# Patient Record
Sex: MALE | Employment: OTHER | URBAN - METROPOLITAN AREA
[De-identification: names, ages, dates, MRNs, and addresses within clinical notes are randomized per-mention and may not be internally consistent; named-entity substitution may affect disease eponyms.]

---

## 2018-05-29 ENCOUNTER — HOSPITAL ENCOUNTER (INPATIENT)
Age: 73
LOS: 1 days | Discharge: LEFT BEFORE DISCHARGE | DRG: 948 | End: 2018-05-30
Attending: EMERGENCY MEDICINE | Admitting: FAMILY MEDICINE
Payer: MEDICARE

## 2018-05-29 ENCOUNTER — APPOINTMENT (OUTPATIENT)
Dept: GENERAL RADIOLOGY | Age: 73
DRG: 948 | End: 2018-05-29
Attending: STUDENT IN AN ORGANIZED HEALTH CARE EDUCATION/TRAINING PROGRAM
Payer: MEDICARE

## 2018-05-29 ENCOUNTER — APPOINTMENT (OUTPATIENT)
Dept: CT IMAGING | Age: 73
DRG: 948 | End: 2018-05-29
Attending: STUDENT IN AN ORGANIZED HEALTH CARE EDUCATION/TRAINING PROGRAM
Payer: MEDICARE

## 2018-05-29 ENCOUNTER — APPOINTMENT (OUTPATIENT)
Dept: CT IMAGING | Age: 73
DRG: 948 | End: 2018-05-29
Attending: FAMILY MEDICINE
Payer: MEDICARE

## 2018-05-29 DIAGNOSIS — G93.40 ACUTE ENCEPHALOPATHY: Primary | ICD-10-CM

## 2018-05-29 DIAGNOSIS — R41.82 ALTERED MENTAL STATUS, UNSPECIFIED ALTERED MENTAL STATUS TYPE: ICD-10-CM

## 2018-05-29 LAB
ABO + RH BLD: NORMAL
ALBUMIN SERPL-MCNC: 3.8 G/DL (ref 3.5–5)
ALBUMIN/GLOB SERPL: 0.9 {RATIO} (ref 1.1–2.2)
ALP SERPL-CCNC: 125 U/L (ref 45–117)
ALT SERPL-CCNC: 30 U/L (ref 12–78)
AMPHET UR QL SCN: NEGATIVE
ANION GAP SERPL CALC-SCNC: 8 MMOL/L (ref 5–15)
APAP SERPL-MCNC: <2 UG/ML (ref 10–30)
APPEARANCE UR: CLEAR
APTT PPP: 25.4 SEC (ref 22.1–32)
ARTERIAL PATENCY WRIST A: YES
AST SERPL-CCNC: 31 U/L (ref 15–37)
ATRIAL RATE: 73 BPM
BACTERIA URNS QL MICRO: NEGATIVE /HPF
BARBITURATES UR QL SCN: POSITIVE
BASE DEFICIT BLD-SCNC: 1 MMOL/L
BASOPHILS # BLD: 0 K/UL (ref 0–0.1)
BASOPHILS NFR BLD: 0 % (ref 0–1)
BDY SITE: ABNORMAL
BENZODIAZ UR QL: NEGATIVE
BILIRUB SERPL-MCNC: 0.4 MG/DL (ref 0.2–1)
BILIRUB UR QL: NEGATIVE
BLOOD GROUP ANTIBODIES SERPL: NORMAL
BLOOD GROUP ANTIBODIES SERPL: NORMAL
BUN SERPL-MCNC: 34 MG/DL (ref 6–20)
BUN/CREAT SERPL: 14 (ref 12–20)
CALCIUM SERPL-MCNC: 9.7 MG/DL (ref 8.5–10.1)
CALCULATED P AXIS, ECG09: 67 DEGREES
CALCULATED R AXIS, ECG10: 16 DEGREES
CALCULATED T AXIS, ECG11: 64 DEGREES
CANNABINOIDS UR QL SCN: NEGATIVE
CHLORIDE SERPL-SCNC: 106 MMOL/L (ref 97–108)
CHOLEST SERPL-MCNC: 142 MG/DL
CK SERPL-CCNC: 184 U/L (ref 39–308)
CO2 SERPL-SCNC: 26 MMOL/L (ref 21–32)
COCAINE UR QL SCN: NEGATIVE
COLOR UR: ABNORMAL
CREAT SERPL-MCNC: 2.39 MG/DL (ref 0.7–1.3)
D DIMER PPP FEU-MCNC: 0.69 MG/L FEU (ref 0–0.65)
DIAGNOSIS, 93000: NORMAL
DIFFERENTIAL METHOD BLD: NORMAL
DRUG SCRN COMMENT,DRGCM: ABNORMAL
EOSINOPHIL # BLD: 0.2 K/UL (ref 0–0.4)
EOSINOPHIL NFR BLD: 3 % (ref 0–7)
EPITH CASTS URNS QL MICRO: ABNORMAL /LPF
ERYTHROCYTE [DISTWIDTH] IN BLOOD BY AUTOMATED COUNT: 13.5 % (ref 11.5–14.5)
EST. AVERAGE GLUCOSE BLD GHB EST-MCNC: 203 MG/DL
ETHANOL SERPL-MCNC: <10 MG/DL
FOLATE SERPL-MCNC: 42.8 NG/ML (ref 5–21)
GAS FLOW.O2 O2 DELIVERY SYS: ABNORMAL L/MIN
GLOBULIN SER CALC-MCNC: 4.1 G/DL (ref 2–4)
GLUCOSE BLD STRIP.AUTO-MCNC: 175 MG/DL (ref 65–100)
GLUCOSE BLD STRIP.AUTO-MCNC: 182 MG/DL (ref 65–100)
GLUCOSE SERPL-MCNC: 197 MG/DL (ref 65–100)
GLUCOSE UR STRIP.AUTO-MCNC: 100 MG/DL
HBA1C MFR BLD: 8.7 % (ref 4.2–6.3)
HCO3 BLD-SCNC: 24.1 MMOL/L (ref 22–26)
HCT VFR BLD AUTO: 40.3 % (ref 36.6–50.3)
HDLC SERPL-MCNC: 34 MG/DL
HDLC SERPL: 4.2 {RATIO} (ref 0–5)
HGB BLD-MCNC: 12.8 G/DL (ref 12.1–17)
HGB UR QL STRIP: ABNORMAL
HYALINE CASTS URNS QL MICRO: ABNORMAL /LPF (ref 0–5)
IMM GRANULOCYTES # BLD: 0 K/UL (ref 0–0.04)
IMM GRANULOCYTES NFR BLD AUTO: 0 % (ref 0–0.5)
INR PPP: 1 (ref 0.9–1.1)
KETONES UR QL STRIP.AUTO: NEGATIVE MG/DL
LACTATE SERPL-SCNC: 0.8 MMOL/L (ref 0.4–2)
LDLC SERPL CALC-MCNC: 60.4 MG/DL (ref 0–100)
LEUKOCYTE ESTERASE UR QL STRIP.AUTO: NEGATIVE
LIPID PROFILE,FLP: ABNORMAL
LYMPHOCYTES # BLD: 1.2 K/UL (ref 0.8–3.5)
LYMPHOCYTES NFR BLD: 16 % (ref 12–49)
MAGNESIUM SERPL-MCNC: 1.7 MG/DL (ref 1.6–2.4)
MAGNESIUM SERPL-MCNC: 1.8 MG/DL (ref 1.6–2.4)
MCH RBC QN AUTO: 30 PG (ref 26–34)
MCHC RBC AUTO-ENTMCNC: 31.8 G/DL (ref 30–36.5)
MCV RBC AUTO: 94.6 FL (ref 80–99)
METHADONE UR QL: NEGATIVE
MONOCYTES # BLD: 0.4 K/UL (ref 0–1)
MONOCYTES NFR BLD: 6 % (ref 5–13)
NEUTS SEG # BLD: 5.4 K/UL (ref 1.8–8)
NEUTS SEG NFR BLD: 74 % (ref 32–75)
NITRITE UR QL STRIP.AUTO: NEGATIVE
NRBC # BLD: 0 K/UL (ref 0–0.01)
NRBC BLD-RTO: 0 PER 100 WBC
O2/TOTAL GAS SETTING VFR VENT: 0.21 %
OPIATES UR QL: NEGATIVE
P-R INTERVAL, ECG05: 184 MS
PCO2 BLD: 38.6 MMHG (ref 35–45)
PCP UR QL: NEGATIVE
PH BLD: 7.4 [PH] (ref 7.35–7.45)
PH UR STRIP: 7.5 [PH] (ref 5–8)
PHOSPHATE SERPL-MCNC: 2.5 MG/DL (ref 2.6–4.7)
PLATELET # BLD AUTO: 154 K/UL (ref 150–400)
PMV BLD AUTO: 9.8 FL (ref 8.9–12.9)
PO2 BLD: 67 MMHG (ref 80–100)
POTASSIUM SERPL-SCNC: 4.9 MMOL/L (ref 3.5–5.1)
PROT SERPL-MCNC: 7.9 G/DL (ref 6.4–8.2)
PROT UR STRIP-MCNC: 300 MG/DL
PROTHROMBIN TIME: 10.7 SEC (ref 9–11.1)
Q-T INTERVAL, ECG07: 414 MS
QRS DURATION, ECG06: 92 MS
QTC CALCULATION (BEZET), ECG08: 456 MS
RBC # BLD AUTO: 4.26 M/UL (ref 4.1–5.7)
RBC #/AREA URNS HPF: ABNORMAL /HPF (ref 0–5)
SALICYLATES SERPL-MCNC: <1.7 MG/DL (ref 2.8–20)
SAO2 % BLD: 93 % (ref 92–97)
SERVICE CMNT-IMP: ABNORMAL
SERVICE CMNT-IMP: ABNORMAL
SODIUM SERPL-SCNC: 140 MMOL/L (ref 136–145)
SP GR UR REFRACTOMETRY: 1.01 (ref 1–1.03)
SPECIMEN EXP DATE BLD: NORMAL
SPECIMEN TYPE: ABNORMAL
THERAPEUTIC RANGE,PTTT: NORMAL SECS (ref 58–77)
TOTAL RESP. RATE, ITRR: 20
TRIGL SERPL-MCNC: 238 MG/DL (ref ?–150)
TROPONIN I SERPL-MCNC: <0.04 NG/ML
TROPONIN I SERPL-MCNC: <0.04 NG/ML
UR CULT HOLD, URHOLD: NORMAL
UROBILINOGEN UR QL STRIP.AUTO: 0.2 EU/DL (ref 0.2–1)
VENTRICULAR RATE, ECG03: 73 BPM
VLDLC SERPL CALC-MCNC: 47.6 MG/DL
WBC # BLD AUTO: 7.3 K/UL (ref 4.1–11.1)
WBC URNS QL MICRO: ABNORMAL /HPF (ref 0–4)

## 2018-05-29 PROCEDURE — 51798 US URINE CAPACITY MEASURE: CPT

## 2018-05-29 PROCEDURE — 80307 DRUG TEST PRSMV CHEM ANLYZR: CPT | Performed by: STUDENT IN AN ORGANIZED HEALTH CARE EDUCATION/TRAINING PROGRAM

## 2018-05-29 PROCEDURE — 93306 TTE W/DOPPLER COMPLETE: CPT | Performed by: FAMILY MEDICINE

## 2018-05-29 PROCEDURE — 65660000000 HC RM CCU STEPDOWN

## 2018-05-29 PROCEDURE — 83036 HEMOGLOBIN GLYCOSYLATED A1C: CPT | Performed by: FAMILY MEDICINE

## 2018-05-29 PROCEDURE — 80053 COMPREHEN METABOLIC PANEL: CPT | Performed by: STUDENT IN AN ORGANIZED HEALTH CARE EDUCATION/TRAINING PROGRAM

## 2018-05-29 PROCEDURE — 74011000250 HC RX REV CODE- 250: Performed by: FAMILY MEDICINE

## 2018-05-29 PROCEDURE — 93880 EXTRACRANIAL BILAT STUDY: CPT

## 2018-05-29 PROCEDURE — 74011250637 HC RX REV CODE- 250/637: Performed by: HOSPITALIST

## 2018-05-29 PROCEDURE — 36415 COLL VENOUS BLD VENIPUNCTURE: CPT | Performed by: STUDENT IN AN ORGANIZED HEALTH CARE EDUCATION/TRAINING PROGRAM

## 2018-05-29 PROCEDURE — 99285 EMERGENCY DEPT VISIT HI MDM: CPT

## 2018-05-29 PROCEDURE — 95816 EEG AWAKE AND DROWSY: CPT | Performed by: FAMILY MEDICINE

## 2018-05-29 PROCEDURE — 83605 ASSAY OF LACTIC ACID: CPT | Performed by: STUDENT IN AN ORGANIZED HEALTH CARE EDUCATION/TRAINING PROGRAM

## 2018-05-29 PROCEDURE — 94640 AIRWAY INHALATION TREATMENT: CPT

## 2018-05-29 PROCEDURE — 80061 LIPID PANEL: CPT | Performed by: FAMILY MEDICINE

## 2018-05-29 PROCEDURE — 81001 URINALYSIS AUTO W/SCOPE: CPT | Performed by: STUDENT IN AN ORGANIZED HEALTH CARE EDUCATION/TRAINING PROGRAM

## 2018-05-29 PROCEDURE — 77030034849

## 2018-05-29 PROCEDURE — 82550 ASSAY OF CK (CPK): CPT | Performed by: FAMILY MEDICINE

## 2018-05-29 PROCEDURE — 82746 ASSAY OF FOLIC ACID SERUM: CPT | Performed by: FAMILY MEDICINE

## 2018-05-29 PROCEDURE — 82962 GLUCOSE BLOOD TEST: CPT

## 2018-05-29 PROCEDURE — 85610 PROTHROMBIN TIME: CPT | Performed by: STUDENT IN AN ORGANIZED HEALTH CARE EDUCATION/TRAINING PROGRAM

## 2018-05-29 PROCEDURE — 86850 RBC ANTIBODY SCREEN: CPT | Performed by: STUDENT IN AN ORGANIZED HEALTH CARE EDUCATION/TRAINING PROGRAM

## 2018-05-29 PROCEDURE — 83735 ASSAY OF MAGNESIUM: CPT | Performed by: STUDENT IN AN ORGANIZED HEALTH CARE EDUCATION/TRAINING PROGRAM

## 2018-05-29 PROCEDURE — 82803 BLOOD GASES ANY COMBINATION: CPT

## 2018-05-29 PROCEDURE — 86870 RBC ANTIBODY IDENTIFICATION: CPT | Performed by: STUDENT IN AN ORGANIZED HEALTH CARE EDUCATION/TRAINING PROGRAM

## 2018-05-29 PROCEDURE — 36600 WITHDRAWAL OF ARTERIAL BLOOD: CPT

## 2018-05-29 PROCEDURE — 74011250636 HC RX REV CODE- 250/636: Performed by: FAMILY MEDICINE

## 2018-05-29 PROCEDURE — 77030005518 HC CATH URETH FOL 2W BARD -B

## 2018-05-29 PROCEDURE — 74011250636 HC RX REV CODE- 250/636

## 2018-05-29 PROCEDURE — 87040 BLOOD CULTURE FOR BACTERIA: CPT | Performed by: STUDENT IN AN ORGANIZED HEALTH CARE EDUCATION/TRAINING PROGRAM

## 2018-05-29 PROCEDURE — 85730 THROMBOPLASTIN TIME PARTIAL: CPT | Performed by: STUDENT IN AN ORGANIZED HEALTH CARE EDUCATION/TRAINING PROGRAM

## 2018-05-29 PROCEDURE — 74176 CT ABD & PELVIS W/O CONTRAST: CPT

## 2018-05-29 PROCEDURE — 71045 X-RAY EXAM CHEST 1 VIEW: CPT

## 2018-05-29 PROCEDURE — 84100 ASSAY OF PHOSPHORUS: CPT | Performed by: FAMILY MEDICINE

## 2018-05-29 PROCEDURE — 84484 ASSAY OF TROPONIN QUANT: CPT | Performed by: STUDENT IN AN ORGANIZED HEALTH CARE EDUCATION/TRAINING PROGRAM

## 2018-05-29 PROCEDURE — 93005 ELECTROCARDIOGRAM TRACING: CPT

## 2018-05-29 PROCEDURE — 74011250637 HC RX REV CODE- 250/637

## 2018-05-29 PROCEDURE — 85379 FIBRIN DEGRADATION QUANT: CPT | Performed by: FAMILY MEDICINE

## 2018-05-29 PROCEDURE — 85025 COMPLETE CBC W/AUTO DIFF WBC: CPT | Performed by: STUDENT IN AN ORGANIZED HEALTH CARE EDUCATION/TRAINING PROGRAM

## 2018-05-29 PROCEDURE — 83735 ASSAY OF MAGNESIUM: CPT | Performed by: FAMILY MEDICINE

## 2018-05-29 PROCEDURE — 74011250636 HC RX REV CODE- 250/636: Performed by: STUDENT IN AN ORGANIZED HEALTH CARE EDUCATION/TRAINING PROGRAM

## 2018-05-29 PROCEDURE — 74011250637 HC RX REV CODE- 250/637: Performed by: FAMILY MEDICINE

## 2018-05-29 PROCEDURE — 70450 CT HEAD/BRAIN W/O DYE: CPT

## 2018-05-29 RX ORDER — AMLODIPINE BESYLATE 10 MG/1
5 TABLET ORAL DAILY
COMMUNITY

## 2018-05-29 RX ORDER — TRAMADOL HYDROCHLORIDE 50 MG/1
50 TABLET ORAL
COMMUNITY

## 2018-05-29 RX ORDER — BUMETANIDE 1 MG/1
1 TABLET ORAL 2 TIMES DAILY
COMMUNITY

## 2018-05-29 RX ORDER — ACETAMINOPHEN 650 MG/1
650 SUPPOSITORY RECTAL
Status: DISCONTINUED | OUTPATIENT
Start: 2018-05-29 | End: 2018-05-30 | Stop reason: HOSPADM

## 2018-05-29 RX ORDER — PRIMIDONE 50 MG/1
50 TABLET ORAL 2 TIMES DAILY
COMMUNITY

## 2018-05-29 RX ORDER — SERTRALINE HYDROCHLORIDE 100 MG/1
100 TABLET, FILM COATED ORAL DAILY
COMMUNITY

## 2018-05-29 RX ORDER — MAGNESIUM SULFATE 100 %
4 CRYSTALS MISCELLANEOUS AS NEEDED
Status: DISCONTINUED | OUTPATIENT
Start: 2018-05-29 | End: 2018-05-30 | Stop reason: HOSPADM

## 2018-05-29 RX ORDER — DOXEPIN HYDROCHLORIDE 10 MG/1
50 CAPSULE ORAL
COMMUNITY

## 2018-05-29 RX ORDER — METOPROLOL SUCCINATE 100 MG/1
25 TABLET, EXTENDED RELEASE ORAL DAILY
COMMUNITY

## 2018-05-29 RX ORDER — ONDANSETRON 2 MG/ML
INJECTION INTRAMUSCULAR; INTRAVENOUS
Status: COMPLETED
Start: 2018-05-29 | End: 2018-05-29

## 2018-05-29 RX ORDER — PANTOPRAZOLE SODIUM 40 MG/1
40 TABLET, DELAYED RELEASE ORAL
Status: DISCONTINUED | OUTPATIENT
Start: 2018-05-30 | End: 2018-05-30 | Stop reason: HOSPADM

## 2018-05-29 RX ORDER — BUDESONIDE AND FORMOTEROL FUMARATE DIHYDRATE 160; 4.5 UG/1; UG/1
2 AEROSOL RESPIRATORY (INHALATION) 2 TIMES DAILY
COMMUNITY

## 2018-05-29 RX ORDER — AMLODIPINE BESYLATE 5 MG/1
5 TABLET ORAL DAILY
Status: DISCONTINUED | OUTPATIENT
Start: 2018-05-30 | End: 2018-05-30 | Stop reason: HOSPADM

## 2018-05-29 RX ORDER — ALLOPURINOL 100 MG/1
100 TABLET ORAL DAILY
COMMUNITY

## 2018-05-29 RX ORDER — DEXTROSE 50 % IN WATER (D50W) INTRAVENOUS SYRINGE
12.5-25 AS NEEDED
Status: DISCONTINUED | OUTPATIENT
Start: 2018-05-29 | End: 2018-05-30 | Stop reason: HOSPADM

## 2018-05-29 RX ORDER — ONDANSETRON 2 MG/ML
4 INJECTION INTRAMUSCULAR; INTRAVENOUS
Status: DISCONTINUED | OUTPATIENT
Start: 2018-05-29 | End: 2018-05-30 | Stop reason: HOSPADM

## 2018-05-29 RX ORDER — INSULIN LISPRO 100 [IU]/ML
INJECTION, SOLUTION INTRAVENOUS; SUBCUTANEOUS EVERY 6 HOURS
Status: DISCONTINUED | OUTPATIENT
Start: 2018-05-29 | End: 2018-05-30 | Stop reason: HOSPADM

## 2018-05-29 RX ORDER — ATORVASTATIN CALCIUM 20 MG/1
80 TABLET, FILM COATED ORAL DAILY
Status: DISCONTINUED | OUTPATIENT
Start: 2018-05-30 | End: 2018-05-30 | Stop reason: HOSPADM

## 2018-05-29 RX ORDER — LIDOCAINE HYDROCHLORIDE 20 MG/ML
JELLY TOPICAL ONCE
Status: DISPENSED | OUTPATIENT
Start: 2018-05-29 | End: 2018-05-29

## 2018-05-29 RX ORDER — SODIUM CHLORIDE 0.9 % (FLUSH) 0.9 %
5-10 SYRINGE (ML) INJECTION AS NEEDED
Status: DISCONTINUED | OUTPATIENT
Start: 2018-05-29 | End: 2018-05-30 | Stop reason: HOSPADM

## 2018-05-29 RX ORDER — TRAMADOL HYDROCHLORIDE 50 MG/1
50 TABLET ORAL
Status: DISCONTINUED | OUTPATIENT
Start: 2018-05-29 | End: 2018-05-30 | Stop reason: HOSPADM

## 2018-05-29 RX ORDER — METOPROLOL SUCCINATE 50 MG/1
25 TABLET, EXTENDED RELEASE ORAL DAILY
Status: DISCONTINUED | OUTPATIENT
Start: 2018-05-30 | End: 2018-05-30 | Stop reason: HOSPADM

## 2018-05-29 RX ORDER — OMEPRAZOLE 20 MG/1
20 CAPSULE, DELAYED RELEASE ORAL DAILY
COMMUNITY

## 2018-05-29 RX ORDER — CALCITRIOL 0.25 UG/1
0.25 CAPSULE ORAL
COMMUNITY

## 2018-05-29 RX ORDER — GUAIFENESIN 100 MG/5ML
81 LIQUID (ML) ORAL
Status: ACTIVE | OUTPATIENT
Start: 2018-05-29 | End: 2018-05-30

## 2018-05-29 RX ORDER — GABAPENTIN 100 MG/1
400 CAPSULE ORAL 3 TIMES DAILY
COMMUNITY

## 2018-05-29 RX ORDER — SODIUM CHLORIDE 0.9 % (FLUSH) 0.9 %
5-10 SYRINGE (ML) INJECTION EVERY 8 HOURS
Status: DISCONTINUED | OUTPATIENT
Start: 2018-05-29 | End: 2018-05-30 | Stop reason: HOSPADM

## 2018-05-29 RX ORDER — SODIUM CHLORIDE 9 MG/ML
75 INJECTION, SOLUTION INTRAVENOUS CONTINUOUS
Status: DISPENSED | OUTPATIENT
Start: 2018-05-29 | End: 2018-05-30

## 2018-05-29 RX ORDER — OMEPRAZOLE 20 MG/1
20 CAPSULE, DELAYED RELEASE ORAL DAILY
Status: DISCONTINUED | OUTPATIENT
Start: 2018-05-30 | End: 2018-05-29 | Stop reason: CLARIF

## 2018-05-29 RX ORDER — ARFORMOTEROL TARTRATE 15 UG/2ML
15 SOLUTION RESPIRATORY (INHALATION)
Status: DISCONTINUED | OUTPATIENT
Start: 2018-05-29 | End: 2018-05-30 | Stop reason: HOSPADM

## 2018-05-29 RX ORDER — BUDESONIDE 0.5 MG/2ML
500 INHALANT ORAL
Status: DISCONTINUED | OUTPATIENT
Start: 2018-05-29 | End: 2018-05-30 | Stop reason: HOSPADM

## 2018-05-29 RX ORDER — HYDRALAZINE HYDROCHLORIDE 20 MG/ML
20 INJECTION INTRAMUSCULAR; INTRAVENOUS
Status: DISCONTINUED | OUTPATIENT
Start: 2018-05-29 | End: 2018-05-30 | Stop reason: HOSPADM

## 2018-05-29 RX ORDER — LORAZEPAM 2 MG/ML
2 INJECTION INTRAMUSCULAR
Status: DISCONTINUED | OUTPATIENT
Start: 2018-05-29 | End: 2018-05-30 | Stop reason: HOSPADM

## 2018-05-29 RX ORDER — CALCITRIOL 0.25 UG/1
0.25 CAPSULE ORAL
Status: DISCONTINUED | OUTPATIENT
Start: 2018-05-30 | End: 2018-05-30 | Stop reason: HOSPADM

## 2018-05-29 RX ORDER — SODIUM CHLORIDE 0.9 % (FLUSH) 0.9 %
SYRINGE (ML) INJECTION
Status: DISPENSED
Start: 2018-05-29 | End: 2018-05-30

## 2018-05-29 RX ORDER — ACETAMINOPHEN 650 MG/1
SUPPOSITORY RECTAL
Status: COMPLETED
Start: 2018-05-29 | End: 2018-05-29

## 2018-05-29 RX ORDER — ATORVASTATIN CALCIUM 10 MG/1
80 TABLET, FILM COATED ORAL DAILY
COMMUNITY

## 2018-05-29 RX ORDER — SERTRALINE HYDROCHLORIDE 50 MG/1
100 TABLET, FILM COATED ORAL DAILY
Status: DISCONTINUED | OUTPATIENT
Start: 2018-05-30 | End: 2018-05-30 | Stop reason: HOSPADM

## 2018-05-29 RX ADMIN — TRAMADOL HYDROCHLORIDE 50 MG: 50 TABLET, FILM COATED ORAL at 23:58

## 2018-05-29 RX ADMIN — SODIUM CHLORIDE 1000 ML: 900 INJECTION, SOLUTION INTRAVENOUS at 13:36

## 2018-05-29 RX ADMIN — BUDESONIDE 500 MCG: 0.5 INHALANT RESPIRATORY (INHALATION) at 22:04

## 2018-05-29 RX ADMIN — ACETAMINOPHEN 650 MG: 650 SUPPOSITORY RECTAL at 18:13

## 2018-05-29 RX ADMIN — ARFORMOTEROL TARTRATE 15 MCG: 15 SOLUTION RESPIRATORY (INHALATION) at 22:04

## 2018-05-29 RX ADMIN — Medication 10 ML: at 18:10

## 2018-05-29 RX ADMIN — SODIUM CHLORIDE 75 ML/HR: 900 INJECTION, SOLUTION INTRAVENOUS at 18:07

## 2018-05-29 RX ADMIN — ACETAMINOPHEN 650 MG: 650 SUPPOSITORY RECTAL at 22:45

## 2018-05-29 RX ADMIN — ONDANSETRON 4 MG: 2 INJECTION INTRAMUSCULAR; INTRAVENOUS at 18:07

## 2018-05-29 RX ADMIN — HYDRALAZINE HYDROCHLORIDE 20 MG: 20 INJECTION INTRAMUSCULAR; INTRAVENOUS at 15:15

## 2018-05-29 NOTE — ED NOTES
Unable to straight cath x 3 unable to advance past prostate. MD notified. Bladder scan showed 228ml.

## 2018-05-29 NOTE — PROCEDURES
[facility]  *** FINAL REPORT ***    Name: Say Velarde  MRN: [id number]    Inpatient  : 1945  HIS Order #: [id number]  [address] [facility] Visit #: [id number]  Date: 29 May 2018    TYPE OF TEST: Cerebrovascular Duplex    REASON FOR TEST  AMS    Right Carotid:-             Proximal               Mid                 Distal  cm/s  Systolic  Diastolic  Systolic  Diastolic  Systolic  Diastolic  CCA:     36.2      13.0                            57.0      14.0  Bulb:  ECA:    123.0      13.0  ICA:     61.0      11.0       97.0      22.0       10.1      24.0  ICA/CCA:  1.1       0.8    ICA Stenosis:    Right Vertebral:-  Finding:  Sys:  Denisse:    Right Subclavian:    Left Carotid:-            Proximal                Mid                 Distal  cm/s  Systolic  Diastolic  Systolic  Diastolic  Systolic  Diastolic  CCA:     85.2      14.0                            71.0      14.0  Bulb:  ECA:     90.0      27.0  ICA:    100.0      16.0       78.0      24.0       90.0      27.0  ICA/CCA:  1.4       1.1    ICA Stenosis:    Left Vertebral:-  Finding:  Sys:  Denisse:    Left Subclavian:    INTERPRETATION/FINDINGS  PROCEDURE:  Color duplex ultrasound imaging of extracranial  cerebrovascular arteries. FINDINGS:       Right:  Internal carotid velocity is within normal limits. There   is narrowing of the internal carotid flow channel on color Doppler  imaging and non-calcific  plaque on B-mode imaging, consistent with  less than 50 percent stenosis. The common and external carotid  arteries are patent and without evidence of hemodynamically  significant stenosis. Left:  Internal carotid velocity is within normal limits. There  is narrowing of the internal carotid flow channel on color Doppler  imaging and non-calcific plaque on B-mode imaging, consistent with  less than 50 percent stenosis.   The common and external carotid  arteries are patent and without evidence of hemodynamically  significant stenosis. IMPRESSION:  Consistent with less than 50% stenosis of the right  internal carotid and less than 50% stenosis of the left internal  carotid. Limited study and unable to visualize vertebrals arteries due   to patient movement. ADDITIONAL COMMENTS    I have personally reviewed the data relevant to the interpretation of  this  study. TECHNOLOGIST: Jose Rodriguez.  Beckie Mackey  Signed: 05/29/2018 05:27 PM    PHYSICIAN: Verner Ledger, MD  Signed: 05/30/2018 10:32 AM

## 2018-05-29 NOTE — H&P
[address]   HISTORY AND PHYSICAL      Dnani Geiger  MR#: [id number]  : 1945  ACCOUNT #: [de-identified]   ADMIT DATE: 2018    CHIEF COMPLAINT:  Altered mental status. HISTORY OF PRESENT ILLNESS:  Patient is a 81-year-old gentleman with past medical history of diabetes, COPD, and renal cancer who presents to the hospital with the above mentioned symptoms. Patient is confused and disoriented, very minimal history could be obtained from the patient. Patient's wife is present at the bedside. The wife herself reports that she has \"memory issues\" and \"does not remember things very clearly,\" thus history was somewhat compromised. Per wife, the patient started having \"more confusion and inappropriate behavior\" for the past few days. Wife reports that the patient has been acting \"very strange, laughing inappropriately and urinated on the door yesterday. \"  The wife reports that they have been driving down from [place] to [place] and part of the driving was done by the patient and the other part was being done by his wife the patient reports. Wife states that wife reported that the patient is complaining of neck pain since last night and his pupils are unequal.  The wife reports the patient had bilateral cataract surgery about one year ago. Wife reports about a few weeks back, patient was admitted with similar symptoms in [place], but no obvious cause was found. The wife reports that he was not sure whether \"he had a seizure,\" but felt that he \"might have had one. \"  The wife reports patient has had triple bypass and has history of renal cancer with unilateral nephrectomy. Currently, the patient is resting in bed, is alert x1 and does not provide much history. Reports he is not having any pain or discomfort anywhere and \"hard for me to pee. \"  The patient denies any headache, blurry vision.   Denies any neck pain or denies any trouble swallowing, trouble with speech, any chest pain, shortness of breath, cough, fevers, chills, abdominal pain, constipation, diarrhea, any focal neurological deficits, any falls, injuries, hematemesis, melena, hemoptysis or any other concerns or problems. PAST MEDICAL HISTORY:  See above. SOCIAL HISTORY:  Former smoker. No alcohol, no IV drug abuse. ALLERGIES:  CONTRAST AGENT. FAMILY HISTORY:  Discussed, could not be obtained from the patient due to altered mental status. REVIEW OF SYMPTOMS:  Suboptimal, but patient denies any other complaints or problems besides what is mentioned above, the patient is confused. PHYSICAL EXAMINATION:  VITAL SIGNS:  Temperature 98.8, pulse 72, respiratory 21, when I went to see the patient, the blood pressure was 212/96 on the monitor. Again blood pressure was 212/96, pulse oximetry 94% on room air. GENERAL:  Alert x1, awake, confused, no apparent distress male, appears to be stated age. HEENT:  Right pupil is irregularly shaped and is about 4-5 mm, left pupil is round and about 2 mm, both are reactive to light. Tympanic membranes clear. NECK:  Supple, no JVD, no meningeal signs, full range of motion. No crepitus, no step-offs. No tenderness or pain was noted. CHEST:  Clear to auscultation bilaterally. HEART:  S1, S2 are heard. ABDOMEN:  Soft, nontender, nondistended. Bowel sounds are physiological.  EXTREMITIES:  No clubbing, no cyanosis, no edema. NEUROPSYCHIATRIC:  Limited exam.  The patient is not following instructions well. Strength appears to be 5/5. Sensory appears to be grossly within normal limits. DTRs appear to be 1+/4. Cranial nerves could not be tested as the patient is not compliant with testing. SKIN:  Warm. LABORATORY DATA:  White count 7.3, hemoglobin 12.8, hematocrit 40.3, platelets 808. INR 1.   Sodium 140, potassium 4.9, chloride 106, bicarbonate 26, anion gap 8, glucose 197, BUN 34, creatinine 2.39, calcium 9.7, magnesium 1.8, bilirubin total 0.4, ALT 30, AST 31, alkaline phosphatase 125, lactic acid 0.8, troponin less than 0.04. Acetaminophen less than 2, salicylate less than 1.7. Alcohol level less than 10. ABG:  pH is 7.403, pCO2 38.6, pO2 67. CT of the head shows right frontal and parietal encephalomalacia, 6 mm hyperdense lesion in the left lateral ventricle. Recommend MRI contrast for further evaluation. X-ray of the chest showed no acute abnormalities. EKG showed normal sinus rhythm. ASSESSMENT AND PLAN:    1. Altered mental status, unclear etiology. Differential includes cerebrovascular accident versus hypertensive encephalopathy versus metabolic encephalopathy versus other etiology. The patient will be admitted on telemetry bed. We will get an MRI of the brain. Start patient on aspirin, continue statin and get a carotid duplex, echocardiogram, troponins, B12, folate, a TSH. We will monitor the patient on telemetry bed. There was concern that the patient had had a seizure, thus we will get an EEG. The patient with seizure precautions, will start Ativan p.r.n. Will provide neurovascular checks. I spoke with the ER physician and at this point of time, Dr. Kristen Swartz feels that the patient does not need a lumbar puncture. I will talk to IR to see if patient needs a lumbar puncture as the patient has been complaining of neck pain. We will get neurology consult and continue to closely monitor. Further intervention will be per hospital course. Reassess as needed. Patient is on fall and aspiration precautions. Optimize blood pressure control and continue to closely monitor. A head CT does not show any obvious signs of bleeding. 2.  Malignant hypertension/ Hypertensive emergency. We will continue home medications, provide p.r.n. medication. p.r.n. IV antihypertensives to keep MAP of around 100, and we will continue to closely monitor. Further intervention will be per hospital course. Reassess as needed.   Further intervention will be per hospital course. Optimize blood pressure control. 3.  Diabetes, poorly controlled. The patient is on sliding scale NovoLog, Accu-Cheks, diet control and close monitoring. Further intervention will be per hospital course. Type 2 diabetes mellitus with hyperglycemia (POA) in the setting of bl sugars 180's since arrival with Hemoglobin A1c = 8.7 requiring point of care testing, SSI coverage and education as appropriate. 4.  Elevated creatinine, unclear whether this is baseline versus new. The patient is not able to urinate, concern about obstruction. CT of the abdomen has been ordered. I request the ER to place a Shipley. ER was not able to place a Shipley and ED physician is calling from urology right now to request them to place a Shipley. Will await urology input and also a urinalysis and urine drug screen. Further intervention will be per hospital course. 5.  Gastrointestinal and deep venous thrombosis prophylaxis. The patient will be on sequential compression devices.       Petrona Chairez MD MM/HERACLIO  D: 05/29/2018 14:34     T: 05/29/2018 15:18  JOB #: [id number]

## 2018-05-29 NOTE — PROGRESS NOTES
Urology consult written:  Unable to straight cath and retention    Saw pt in ED where he was straight cathed x3 for urinalysis with no success. Pt bladder scanned for 238 ml. Pt was wearing a brief which was wet. Attempt was made to give pt clean urinal however pt voided all over his gown and the bed as it appears he doesn't have the dexterity to hold the urinal in the proper position. Due to hematuria, a condom cath was placed on the patient. ED was not able to collect urine. Saw pt at 1700 after arriving to Neuro from vascular. Pt was dry heaving. RN said condom cath was next to patient on the bed. Updated RN on above, she will collect urine from clean urinal and send for urinalysis. Dr. Davis Later advised of the above and agreed to cancel consult.       Thank you,    Alexis Lin  RN,  Urology RN Coordinator,  [phone]

## 2018-05-29 NOTE — ED NOTES
Updated nurse, Cristiano, on pt going to vascular first before coming to floor. Told her the nurse's name for urology, Nayely and number [phone].

## 2018-05-29 NOTE — ED PROVIDER NOTES
HPI Comments: 68 y.o. male with past medical history significant for DM Type II, COPD, and renal CA who presents from [facility] via EMS with chief complaint of AMS. Pt's wife and EMS at bedside providing the history. Pt and his wife are driving from [place] to [place] to visit their daughter, pt woke up yesterday acting \"very strange\", laughing inappropriately, and last night was found urinating by the door. Wife and EMS state the pt is complaining of neck pain since last night, and his pupils are unequal. Wife states the pt had bilateral cataract surgery about 1 year ago. Wife notes one previous episode of confusion, but no etiology was found at that time. When wife is asked about any seizure-like activity, she states last night the pt had \"what might have been a seizure\" but unable to elaborate. Wife reports a history of a triple CABG, DM Type 2, renal CA with unilateral nephrectomy. Wife denies any anticoagulant use. Pt denies any vision changes, chest pain, abdominal pain, vomiting, or HA. There are no other acute medical concerns at this time. Full history, physical exam, and ROS unable to be obtained due to:  AMS. Social hx: Former smoker ([address]); No EtOH use    Note written by Mikel Palencia, as dictated by Oleg Carvajal MD 11:32 AM    The history is provided by the patient, the spouse and the EMS personnel. No  was used. Past Medical History:   Diagnosis Date    [facility] obstructive pulmonary disease ([facility]     DM type 2 (diabetes mellitus, type 2) ([facility]     Kidney carcinoma ([facility] [facility]        Past Surgical History:   Procedure Laterality Date    HX CORONARY ARTERY BYPASS GRAFT      triple bypass    HX NEPHRECTOMY           History reviewed. No pertinent family history. Social History     Social History    Marital status:      Spouse name: N/A    Number of children: N/A    Years of education: N/A     Occupational History    Not on file.      Social History Main Topics    Smoking status: Former Smoker     Quit date: 5/29/1980    Smokeless tobacco: Not on file    Alcohol use No    Drug use: No    Sexual activity: Not on file     Other Topics Concern    Not on file     Social History Narrative    No narrative on file         ALLERGIES: Contrast agent [iodine]    Review of Systems   Unable to perform ROS: Mental status change       Vitals:    05/29/18 1134 05/29/18 1145   BP: 176/80 176/83   Pulse: 73 72   Resp: 17 21   SpO2: 94% 94%   Weight: 120.9 kg (266 lb 8 oz)    Height: 5' 8\" (1.727 m)             Physical Exam   Constitutional: He appears well-developed. He appears ill. No distress. Obese. Chronically ill appearing. HENT:   Head: Normocephalic and atraumatic. Eyes: Conjunctivae and EOM are normal. Right pupil is not round. Right pupil is reactive. Left pupil is round and reactive. Pupils are unequal.   R pupil is irregularly shaped and 4 mm. L pupil is round at 2 mm. Both are reactive to light. Neck: Normal range of motion. Neck supple. Cardiovascular: Normal rate, regular rhythm and normal heart sounds. No murmur heard. Pulmonary/Chest: Effort normal and breath sounds normal. No respiratory distress. Abdominal: Soft. Bowel sounds are normal. He exhibits no distension. There is no tenderness. There is no rebound. Musculoskeletal: Normal range of motion. He exhibits no edema or tenderness. Neurological: He is alert. He exhibits normal muscle tone. Coordination normal.   Oriented to person only. Clear speech. Moving all extremities. Following simple commands. Skin: Skin is warm and dry. Nursing note and vitals reviewed. Note written by Mikel Pierre, as dictated by Gilberto Campo MD 11:32 AM    [facility]      ED Course       Procedures      ED EKG interpretation: 11:28  Rhythm: normal sinus rhythm; and regular . Rate (approx.): 73 bpm; Axis: normal; ST/T wave: normal; Normal intervals; No STEMI.      Note written by Meghan Fernandez Nikolay Vázquez, as dictated by Jay Mobley MD 12:31 PM       CONSULT NOTE:  1:33 PM Jay Mobley MD communicated with Dr. Erika Carranza, Consult for Hospitalist via [facility] Text. Discussed available diagnostic tests and clinical findings. He will see and admit the pt.

## 2018-05-29 NOTE — ED NOTES
TRANSFER - OUT REPORT:    Verbal report given to Shanta Palencia RN(name) on [id number]  being transferred to 652(unit) for routine progression of care       Report consisted of patients Situation, Background, Assessment and   Recommendations(SBAR). Information from the following report(s) SBAR, ED Summary, Procedure Summary, MAR and Recent Results was reviewed with the receiving nurse. Lines:   Peripheral IV 05/29/18 Left Antecubital (Active)   Site Assessment Clean, dry, & intact 5/29/2018 11:48 AM   Phlebitis Assessment 0 5/29/2018 11:48 AM   Infiltration Assessment 0 5/29/2018 11:48 AM   Dressing Status Clean, dry, & intact 5/29/2018 11:48 AM   Hub Color/Line Status Green 5/29/2018 11:48 AM       Peripheral IV 05/29/18 Right Antecubital (Active)   Site Assessment Clean, dry, & intact 5/29/2018 11:49 AM   Phlebitis Assessment 0 5/29/2018 11:49 AM   Infiltration Assessment 0 5/29/2018 11:49 AM   Hub Color/Line Status Pink 5/29/2018 11:49 AM        Opportunity for questions and clarification was provided.       Patient transported with:   Tech from Vascular

## 2018-05-29 NOTE — ED TRIAGE NOTES
Triage: pt traveling with wife by car from [place] to [place]. EMS was called to [facility] for AMS, urinary and fecal incontinence, neck pain. Pt alert to self only. . Hx of kidney ca w/ kidney removal, DM, and triple CABG. LKW yesterday morning when he woke up.

## 2018-05-29 NOTE — PROGRESS NOTES
Bedside and Verbal shift change report given to Javid Grissom RN (oncoming nurse) by Mayank Ac RN (offgoing nurse). Report included the following information SBAR, Kardex, Intake/Output, MAR, Recent Results and Cardiac Rhythm SR with 1degreee AV block.

## 2018-05-30 ENCOUNTER — APPOINTMENT (OUTPATIENT)
Dept: NUCLEAR MEDICINE | Age: 73
DRG: 948 | End: 2018-05-30
Attending: FAMILY MEDICINE
Payer: MEDICARE

## 2018-05-30 ENCOUNTER — APPOINTMENT (OUTPATIENT)
Dept: MRI IMAGING | Age: 73
DRG: 948 | End: 2018-05-30
Attending: FAMILY MEDICINE
Payer: MEDICARE

## 2018-05-30 VITALS
BODY MASS INDEX: 39.86 KG/M2 | HEART RATE: 75 BPM | DIASTOLIC BLOOD PRESSURE: 73 MMHG | WEIGHT: 263 LBS | SYSTOLIC BLOOD PRESSURE: 167 MMHG | HEIGHT: 68 IN | OXYGEN SATURATION: 94 % | RESPIRATION RATE: 12 BRPM | TEMPERATURE: 98.1 F

## 2018-05-30 LAB
ALBUMIN SERPL-MCNC: 3.2 G/DL (ref 3.5–5)
ALBUMIN/GLOB SERPL: 0.9 {RATIO} (ref 1.1–2.2)
ALP SERPL-CCNC: 100 U/L (ref 45–117)
ALT SERPL-CCNC: 24 U/L (ref 12–78)
ANION GAP SERPL CALC-SCNC: 10 MMOL/L (ref 5–15)
AST SERPL-CCNC: 32 U/L (ref 15–37)
BASOPHILS # BLD: 0 K/UL (ref 0–0.1)
BASOPHILS NFR BLD: 0 % (ref 0–1)
BILIRUB SERPL-MCNC: 0.4 MG/DL (ref 0.2–1)
BUN SERPL-MCNC: 32 MG/DL (ref 6–20)
BUN/CREAT SERPL: 16 (ref 12–20)
CALCIUM SERPL-MCNC: 9 MG/DL (ref 8.5–10.1)
CHLORIDE SERPL-SCNC: 108 MMOL/L (ref 97–108)
CK SERPL-CCNC: 361 U/L (ref 39–308)
CO2 SERPL-SCNC: 23 MMOL/L (ref 21–32)
CREAT SERPL-MCNC: 2.03 MG/DL (ref 0.7–1.3)
DIFFERENTIAL METHOD BLD: ABNORMAL
EOSINOPHIL # BLD: 0.1 K/UL (ref 0–0.4)
EOSINOPHIL NFR BLD: 1 % (ref 0–7)
ERYTHROCYTE [DISTWIDTH] IN BLOOD BY AUTOMATED COUNT: 13.4 % (ref 11.5–14.5)
GLOBULIN SER CALC-MCNC: 3.5 G/DL (ref 2–4)
GLUCOSE BLD STRIP.AUTO-MCNC: 189 MG/DL (ref 65–100)
GLUCOSE BLD STRIP.AUTO-MCNC: 215 MG/DL (ref 65–100)
GLUCOSE SERPL-MCNC: 190 MG/DL (ref 65–100)
HCT VFR BLD AUTO: 36.6 % (ref 36.6–50.3)
HGB BLD-MCNC: 11.5 G/DL (ref 12.1–17)
IMM GRANULOCYTES # BLD: 0.1 K/UL (ref 0–0.04)
IMM GRANULOCYTES NFR BLD AUTO: 1 % (ref 0–0.5)
LACTATE SERPL-SCNC: 0.8 MMOL/L (ref 0.4–2)
LYMPHOCYTES # BLD: 1 K/UL (ref 0.8–3.5)
LYMPHOCYTES NFR BLD: 14 % (ref 12–49)
MCH RBC QN AUTO: 30 PG (ref 26–34)
MCHC RBC AUTO-ENTMCNC: 31.4 G/DL (ref 30–36.5)
MCV RBC AUTO: 95.6 FL (ref 80–99)
MONOCYTES # BLD: 0.5 K/UL (ref 0–1)
MONOCYTES NFR BLD: 8 % (ref 5–13)
NEUTS SEG # BLD: 5.4 K/UL (ref 1.8–8)
NEUTS SEG NFR BLD: 76 % (ref 32–75)
NRBC # BLD: 0 K/UL (ref 0–0.01)
NRBC BLD-RTO: 0 PER 100 WBC
PLATELET # BLD AUTO: 108 K/UL (ref 150–400)
PMV BLD AUTO: 9.6 FL (ref 8.9–12.9)
POTASSIUM SERPL-SCNC: 4.4 MMOL/L (ref 3.5–5.1)
PROT SERPL-MCNC: 6.7 G/DL (ref 6.4–8.2)
RBC # BLD AUTO: 3.83 M/UL (ref 4.1–5.7)
SERVICE CMNT-IMP: ABNORMAL
SERVICE CMNT-IMP: ABNORMAL
SODIUM SERPL-SCNC: 141 MMOL/L (ref 136–145)
TROPONIN I SERPL-MCNC: 0.04 NG/ML
WBC # BLD AUTO: 7.1 K/UL (ref 4.1–11.1)

## 2018-05-30 PROCEDURE — 94640 AIRWAY INHALATION TREATMENT: CPT

## 2018-05-30 PROCEDURE — 74011250636 HC RX REV CODE- 250/636: Performed by: INTERNAL MEDICINE

## 2018-05-30 PROCEDURE — 82550 ASSAY OF CK (CPK): CPT | Performed by: FAMILY MEDICINE

## 2018-05-30 PROCEDURE — 84484 ASSAY OF TROPONIN QUANT: CPT | Performed by: FAMILY MEDICINE

## 2018-05-30 PROCEDURE — 93306 TTE W/DOPPLER COMPLETE: CPT

## 2018-05-30 PROCEDURE — 74011250637 HC RX REV CODE- 250/637: Performed by: FAMILY MEDICINE

## 2018-05-30 PROCEDURE — 83605 ASSAY OF LACTIC ACID: CPT | Performed by: STUDENT IN AN ORGANIZED HEALTH CARE EDUCATION/TRAINING PROGRAM

## 2018-05-30 PROCEDURE — 74011250636 HC RX REV CODE- 250/636: Performed by: HOSPITALIST

## 2018-05-30 PROCEDURE — 80053 COMPREHEN METABOLIC PANEL: CPT | Performed by: FAMILY MEDICINE

## 2018-05-30 PROCEDURE — 36415 COLL VENOUS BLD VENIPUNCTURE: CPT | Performed by: FAMILY MEDICINE

## 2018-05-30 PROCEDURE — 82962 GLUCOSE BLOOD TEST: CPT

## 2018-05-30 PROCEDURE — 74011000250 HC RX REV CODE- 250: Performed by: HOSPITALIST

## 2018-05-30 PROCEDURE — 74011000250 HC RX REV CODE- 250: Performed by: FAMILY MEDICINE

## 2018-05-30 PROCEDURE — A9540 TC99M MAA: HCPCS

## 2018-05-30 PROCEDURE — 70551 MRI BRAIN STEM W/O DYE: CPT

## 2018-05-30 PROCEDURE — 85025 COMPLETE CBC W/AUTO DIFF WBC: CPT | Performed by: FAMILY MEDICINE

## 2018-05-30 PROCEDURE — 74011636637 HC RX REV CODE- 636/637: Performed by: FAMILY MEDICINE

## 2018-05-30 RX ORDER — HALOPERIDOL 5 MG/ML
1 INJECTION INTRAMUSCULAR
Status: COMPLETED | OUTPATIENT
Start: 2018-05-30 | End: 2018-05-30

## 2018-05-30 RX ADMIN — ARFORMOTEROL TARTRATE 15 MCG: 15 SOLUTION RESPIRATORY (INHALATION) at 08:03

## 2018-05-30 RX ADMIN — METOPROLOL SUCCINATE 25 MG: 50 TABLET, EXTENDED RELEASE ORAL at 08:59

## 2018-05-30 RX ADMIN — BUDESONIDE 500 MCG: 0.5 INHALANT RESPIRATORY (INHALATION) at 08:03

## 2018-05-30 RX ADMIN — AMLODIPINE BESYLATE 5 MG: 5 TABLET ORAL at 08:59

## 2018-05-30 RX ADMIN — ATORVASTATIN CALCIUM 80 MG: 20 TABLET, FILM COATED ORAL at 08:58

## 2018-05-30 RX ADMIN — INSULIN LISPRO 2 UNITS: 100 INJECTION, SOLUTION INTRAVENOUS; SUBCUTANEOUS at 12:51

## 2018-05-30 RX ADMIN — PANTOPRAZOLE SODIUM 40 MG: 40 TABLET, DELAYED RELEASE ORAL at 07:54

## 2018-05-30 RX ADMIN — SODIUM CHLORIDE 5 MG: 9 INJECTION INTRAMUSCULAR; INTRAVENOUS; SUBCUTANEOUS at 01:20

## 2018-05-30 RX ADMIN — SERTRALINE HYDROCHLORIDE 100 MG: 50 TABLET ORAL at 08:58

## 2018-05-30 RX ADMIN — HALOPERIDOL LACTATE 1 MG: 5 INJECTION, SOLUTION INTRAMUSCULAR at 07:52

## 2018-05-30 NOTE — CDMP QUERY
There is documentation of the pt. having diabetes: uncontrolled. Can this be further specified as:     => Type 2 diabetes mellitus with hyperglycemia (POA) in the setting of bl sugars 180's since arrival with Hemoglobin A1c = 8.7 requiring point of care testing, SSI coverage and education as appropriate.  => Other explanation of clinical findings  => Clinically Undetermined (no explanation for clinical findings)    The medical record reflects the following:    Clinical Indicators/Risk Factors:  DM-2, bl sugars 180's since admission, HgA1c = 8.7      Treatment: Point of care testing, SSI coverage and education as appropriate. Please clarify and document your clinical opinion in the progress notes and discharge summary including the definitive and/or presumptive diagnosis, (suspected or probable), related to the above clinical findings. Please include clinical findings supporting your diagnosis.     Thank you,    Simi Alfaro, RN, BSN, [facility], [address] [place]  [phone]

## 2018-05-30 NOTE — CDMP QUERY
Patient is noted to have a BMI of 39.99 kg/m 2. Please clarify if this patient is:     =>Morbidly obese  =>Obese   =>Overweight  =>Other explanation of clinical findings  =>Unable to determine (no explanation for clinical findings)    Presentation: Weight: 120.9 kg (266 lb 8 oz) &     Height: 5' 8\" (1.727 m) = BMI: 39.99 kg/m 2    Please clarify and document your clinical opinion in the progress notes and discharge summary, including the definitive and or presumptive diagnosis, (suspected or probable), related to the above clinical findings. Please include clinical findings supporting your diagnosis.     Thank you,    Zhen Valerio, RN, BSN, [facility] [address], [address] [place]  [phone]

## 2018-05-30 NOTE — PROGRESS NOTES
SPEECH THERAPY SCREENING:  SERVICES ARE NOT INDICATED AT THIS TIME    [facility] speech therapy based on results obtained during the nursing admission assessment. The patients chart was reviewed and the patient is not appropriate for a skilled therapy evaluation at this time. Please consult speech therapy if any therapy needs arise. Thank you.     Agnes Cortez, SLP

## 2018-05-30 NOTE — CDMP QUERY
The diagnosis of malignant HTN has been documented for this patient. In ICD-10, malignant HTN is an unspecified term. Based on your medical judgement, could your documentation be further specified as:     =>Hypertensive urgency  =>Hypertensive crisis  =>Hypertensive emergency  =>Other Explanation of clinical findings  =>Clinically Undetermined (no explanation for clinical findings)     The medical record reflects the following clinical findings, treatment, and risk factors:     Risk Factors:   Clinical Indicators:  Treatment:      Please clarify and document your clinical opinion in the progress notes and discharge summary including the definitive and/or presumptive diagnosis, (suspected or probable), related to the above clinical findings. Please include clinical findings supporting your diagnosis.      REFERENCE:  -----------------------------------------------  Hypertensive Urgency: SBP > 180 or DBP > 120 in the absence of progressive target organ dysfunction     Hypertensive Crisis: BP elevates rapidly and severely enough to potentially cause organ damage (e.g. severe HA, SOB, nosebleed, severe anxiety, nausea/vomiting, etc.)    Hypertensive Emergency: SBP >180 or DBP > 120 with associated organ dysfunction (e.g. CVA, LOC, memory loss, MI, MALUO, aortic dissection, angina, pulmonary edema, encephalopathy, retinopathy, HELLP, etc.)    Thank you,    Wendy Osborn RN, BSN, [place] [address], [facility]  [phone]

## 2018-05-30 NOTE — CDMP QUERY
Please give the clinical significance of the following lab data. CT head (05/29) : 1. Right frontal and parietal encephalomalacia. No intracranial hemorrhage noted. 2. 6 mm hyperdense lesion in the lateral left ventricle    Pt presentation: noted increased confusion by wife. Alert and oriented to self only when presented to ED. Please clarify and document your clinical opinion in the progress notes and discharge summary including the definitive and/or presumptive diagnosis, (suspected or probable), related to the above clinical findings. Please include clinical findings supporting your diagnosis.     Thank you,    Dannis Hodgkin, RN, BSN, [facility] [address], [address] [facility] [facility]  [phone]

## 2018-05-30 NOTE — PROGRESS NOTES
Case Management: reviewed chart independently and demographics with patient. Patient lives in [place] and was traveling to [place] to visit daughter and became ill. PCP in [place] is  Dr. Valentina Dominguez from the [place] there. Prescriptions filled at [facility] in [place] and [place]. Support is wife and daughter. Patients daughter and son in law drove from [place] to help Dad and Mom with transportation back to [place]. DME used is walker and cane. Patient for discharge today. No care management needs. Reason for Admission:  Altered mental status                    RRAT Score:     8                Plan for utilizing home health:     no                     Likelihood of Readmission:                         low  Transition of Care Plan:   Drive to [place] to daughters house and follow up with [facility] for follow up and prescriptions. Care Management Interventions  PCP Verified by CM: Yes (PCP in [place]: [place] / Dr. Valentina Dominguez.)  Palliative Care Criteria Met (RRAT>21 & CHF Dx)?: No (RRAT=8)  Mode of Transport at Discharge: Other (see comment) (private auto with family.)  Transition of Care Consult (CM Consult): Other (headed to [place] to daughters and will check in with [facility] in [facility].)  MyChart Signup: No  Discharge Durable Medical Equipment: Yes (walker and cane)  Physical Therapy Consult: No  Occupational Therapy Consult: No  Speech Therapy Consult: No  Current Support Network: Lives with Spouse  Confirm Follow Up Transport: Family  Plan discussed with Pt/Family/Caregiver: Yes  [place] Resource Information Provided?: Yes  Discharge Location  Discharge Placement: Home (to daughters in [place] and [facility] and [facility])     1413: IDR rounds: Dr. Shay Cartagena , Nurse, Care Manager and Clinical Lead discussed with patient who is leaving AMA. Dr. Shay Cartagena discussed risks and need for completion of care here at [facility] [place]. Patient and wife declined and will sign out AMA.     Jayjay Avina RN BSN CM

## 2018-05-30 NOTE — PROGRESS NOTES
Hospitalist Progress Note  Aaron Espinoza MD  Answering service: [phone] OR [phone] from in house phone  Cell:       Date of Service:  2018  NAME:  Tung Olsen  :  1945  MRN:  [id number]      Admission Summary:   30-year-old gentleman with past medical history of diabetes, COPD, and renal cancer who presents to the hospital with altered mental status. Interval history / Subjective:     Patient seen and examined bedside. Symptoms have completely resolved. He is alert and oriented. Responding appropriately. Denies any complaints. Assessment & Plan:     1. Altered mental status, unclear etiology. Differential includes cerebrovascular accident versus hypertensive encephalopathy versus metabolic encephalopathy versus other etiology. C/w aspirin, continue statin   MRI brain inconclusive, repeat with contrast  F/u echocardiogram,  Monitor on telemetry bed.    seizure precautions  Neuro check  Carotid doppler unremarkable  CT head () : 1. Right frontal and parietal encephalomalacia. No intracranial hemorrhage noted.  2. 6 mm hyperdense lesion in the lateral left ventricle-> will need MRI with contrast for further evaluation  F/u neuro recs    2. Hypertension =>Other Explanation of clinical findings  - c/w home medications  - Monitor for now    3. Type 2 diabetes mellitus with hyperglycemia (POA) in the setting of bl sugars 180's since arrival with Hemoglobin A1c = 8.7 requiring point of care testing, SSI coverage and education as appropriate. - ISS  - Diabetic diet     4. Elevated creatinine  - Likely 2/2 dehydration  - C/w IVF  - Improved with fluids    5.  Obese BMI 39.9    Code status: Full  DVT prophylaxis: SCDs    Care Plan discussed with: Patient/Family  Disposition: TBD     Hospital Problems  Date Reviewed: 2018          Codes Class Noted POA    * (Principal)Altered mental status ICD-10-CM: R41.82  ICD-9-CM: 780.97  5/29/2018 Unknown                Review of Systems:   A comprehensive review of systems was negative except for that written in the HPI. Vital Signs:    Last 24hrs VS reviewed since prior progress note. Most recent are:  Visit Vitals    /73 (BP 1 Location: Right arm, BP Patient Position: At rest;Head of bed elevated (Comment degrees))    Pulse 75    Temp 98.1 °F (36.7 °C)    Resp 12    Ht 5' 8\" (1.727 m)    Wt 119.3 kg (263 lb)    SpO2 94%    BMI 39.99 kg/m2         Intake/Output Summary (Last 24 hours) at 05/30/18 1341  Last data filed at 05/30/18 0706   Gross per 24 hour   Intake               10 ml   Output              700 ml   Net             -690 ml        Physical Examination:             Constitutional:  No acute distress, cooperative, pleasant    ENT:  Oral mucous moist, oropharynx benign. Neck supple,    Resp:  CTA bilaterally. No wheezing/rhonchi/rales. No accessory muscle use   CV:  Regular rhythm, normal rate, no murmurs, gallops, rubs    GI:  Soft, non distended, non tender. normoactive bowel sounds, no hepatosplenomegaly     Musculoskeletal:  No edema, warm, 2+ pulses throughout    Neurologic:  Moves all extremities. AAOx3, CN II-XII reviewed     Psych:  Good insight, Not anxious nor agitated.        Data Review:    Review and/or order of clinical lab test      Labs:     Recent Labs      05/30/18   0202  05/29/18   1138   WBC  7.1  7.3   HGB  11.5*  12.8   HCT  36.6  40.3   PLT  108*  154     Recent Labs      05/30/18   0159  05/29/18   1527  05/29/18   1138   NA  141   --   140   K  4.4   --   4.9   CL  108   --   106   CO2  23   --   26   BUN  32*   --   34*   CREA  2.03*   --   2.39*   GLU  190*   --   197*   CA  9.0   --   9.7   MG   --   1.7  1.8   PHOS   --   2.5*   --      Recent Labs      05/30/18   0159  05/29/18   1138   SGOT  32  31   ALT  24  30   AP  100  125*   TBILI  0.4  0.4   TP  6.7  7.9   ALB  3.2*  3.8   GLOB  3.5  4.1*     Recent Labs      05/29/18 1138   INR  1.0   PTP  10.7   APTT  25.4      No results for input(s): FE, TIBC, PSAT, FERR in the last 72 hours. Lab Results   Component Value Date/Time    Folate 42.8 (H) 05/29/2018 11:38 AM      No results for input(s): PH, PCO2, PO2 in the last 72 hours.   Recent Labs      05/30/18   0159  05/29/18   1527  05/29/18   1138   CPK  361*  184   --    TROIQ  0.04  <0.04  <0.04     Lab Results   Component Value Date/Time    Cholesterol, total 142 05/29/2018 03:27 PM    HDL Cholesterol 34 05/29/2018 03:27 PM    LDL, calculated 60.4 05/29/2018 03:27 PM    Triglyceride 238 (H) 05/29/2018 03:27 PM    CHOL/HDL Ratio 4.2 05/29/2018 03:27 PM     Lab Results   Component Value Date/Time    Glucose (POC) 215 (H) 05/30/2018 12:33 PM    Glucose (POC) 189 (H) 05/30/2018 06:03 AM    Glucose (POC) 182 (H) 05/29/2018 11:54 PM    Glucose (POC) 175 (H) 05/29/2018 06:18 PM     Lab Results   Component Value Date/Time    Color YELLOW/STRAW 05/29/2018 05:10 PM    Appearance CLEAR 05/29/2018 05:10 PM    Specific gravity 1.015 05/29/2018 05:10 PM    pH (UA) 7.5 05/29/2018 05:10 PM    Protein 300 (A) 05/29/2018 05:10 PM    Glucose 100 (A) 05/29/2018 05:10 PM    Ketone NEGATIVE  05/29/2018 05:10 PM    Bilirubin NEGATIVE  05/29/2018 05:10 PM    Urobilinogen 0.2 05/29/2018 05:10 PM    Nitrites NEGATIVE  05/29/2018 05:10 PM    Leukocyte Esterase NEGATIVE  05/29/2018 05:10 PM    Epithelial cells FEW 05/29/2018 05:10 PM    Bacteria NEGATIVE  05/29/2018 05:10 PM    WBC 0-4 05/29/2018 05:10 PM    RBC  05/29/2018 05:10 PM         Medications Reviewed:     Current Facility-Administered Medications   Medication Dose Route Frequency    prochlorperazine (COMPAZINE) with saline injection 5 mg  5 mg IntraVENous Q4H PRN    amLODIPine (NORVASC) tablet 5 mg  5 mg Oral DAILY    atorvastatin (LIPITOR) tablet 80 mg  80 mg Oral DAILY    calcitRIOL (ROCALTROL) capsule 0.25 mcg  0.25 mcg Oral Q MON, WED & FRI    metoprolol succinate (TOPROL-XL) XL tablet 25 mg  25 mg Oral DAILY    sertraline (ZOLOFT) tablet 100 mg  100 mg Oral DAILY    sodium chloride (NS) flush 5-10 mL  5-10 mL IntraVENous Q8H    sodium chloride (NS) flush 5-10 mL  5-10 mL IntraVENous PRN    glucose chewable tablet 16 g  4 Tab Oral PRN    dextrose (D50W) injection syrg 12.5-25 g  12.5-25 g IntraVENous PRN    glucagon (GLUCAGEN) injection 1 mg  1 mg IntraMUSCular PRN    insulin lispro (HUMALOG) injection   SubCUTAneous Q6H    hydrALAZINE (APRESOLINE) 20 mg/mL injection 20 mg  20 mg IntraVENous Q6H PRN    LORazepam (ATIVAN) injection 2 mg  2 mg IntraVENous Q4H PRN    pantoprazole (PROTONIX) tablet 40 mg  40 mg Oral ACB    arformoterol (BROVANA) neb solution 15 mcg  15 mcg Nebulization BID RT    And    budesonide (PULMICORT) 500 mcg/2 ml nebulizer suspension  500 mcg Nebulization BID RT    ondansetron (ZOFRAN) injection 4 mg  4 mg IntraVENous Q4H PRN    acetaminophen (TYLENOL) suppository 650 mg  650 mg Rectal Q4H PRN    traMADol (ULTRAM) tablet 50 mg  50 mg Oral BID PRN     ______________________________________________________________________  EXPECTED LENGTH OF STAY: 2d 9h  ACTUAL LENGTH OF STAY:          1                 Stacy Yi MD

## 2018-05-30 NOTE — PROGRESS NOTES
0730 Bedside shift change report given to Tresa Coon RN (oncoming nurse) by Luke Snell (offgoing nurse). Report included the following information SBAR, Kardex, Intake/Output, MAR and Cardiac Rhythm NSR 1st degree AV block.

## 2018-05-30 NOTE — PROGRESS NOTES
Pt. Leaving against medical advice. This decision was discussed with provider, CM, CCL, and RN with patient and patient's spouse. Understanding of risks were verbalized. Informed refusal of treatment was gone over with pt and spouse, signed by spouse and placed in pt's chart.

## 2018-05-30 NOTE — ROUTINE PROCESS
IDR Summary          Patient: Troy Haas MRN: [id number]    Age: 68 y.o. YOB: 1945 Room/Bed: [facility]   Admit Diagnosis: Altered mental status  Principal Diagnosis: Altered mental status   [facility]: Attending [address] [place] [address]  PCP: Juan Antonio Rodrigues MD    Readmission: NO          Testing due for pt today?  yes    Discharge plan for the day: leaving AMA       Discharge plan for the stay: MRI, V/Q scan    Discharge plan for the way:  Leaving AMA    Signed:     Fartun Trivedi  5/30/2018  2:29 PM

## 2018-05-31 NOTE — PROCEDURES
[address]  EEG    Florence Santana  MR#: [id number]  : 1945  ACCOUNT #: [de-identified]   DATE OF SERVICE: 2018    REQUESTING PHYSICIAN:  Dr. Angela Martins    HISTORY:  The patient is a 42-year-old male who is being evaluated for altered mental status. DESCRIPTION:  This is an 18-channel EEG performed on an awake, but altered patient. The dominant posterior background rhythm consists of medium voltage rhythms in the 6-7 Hz frequency range out of the posterior head region. There was slower delta frequencies seen in the frontal head region during most part of this recording. At times, the slowing is of a triphasic configuration. No clear drowsiness or sleep architecture was seen. Photic stimulation and hyperventilation were not performed. EEG SUMMARY:  Abnormal EEG due mild to moderate slowing of the background rhythms with occasional slower waveforms of triphasic configuration. CLINICAL INTERPRETATION:  This EEG is suggestive of mild to moderate generalized encephalopathic process, nonspecific in type. This is most likely related to underlying toxic/metabolic abnormality, but could also represent an underlying structural brain injury. No clearly lateralizing or epileptiform features were seen. Please correlate clinically and with the imaging studies.       MD MER Mcmahon / SN  D: 2018 14:15     T: 2018 14:39  JOB #: [id number]

## 2018-06-03 LAB
BACTERIA SPEC CULT: NORMAL
SERVICE CMNT-IMP: NORMAL